# Patient Record
Sex: MALE | Race: WHITE | URBAN - METROPOLITAN AREA
[De-identification: names, ages, dates, MRNs, and addresses within clinical notes are randomized per-mention and may not be internally consistent; named-entity substitution may affect disease eponyms.]

---

## 2021-02-02 ENCOUNTER — OFFICE VISIT (OUTPATIENT)
Dept: PHYSICAL THERAPY | Facility: CLINIC | Age: 36
End: 2021-02-02
Payer: COMMERCIAL

## 2021-02-02 DIAGNOSIS — M54.50 ACUTE BILATERAL LOW BACK PAIN WITHOUT SCIATICA: Primary | ICD-10-CM

## 2021-02-02 PROCEDURE — 97110 THERAPEUTIC EXERCISES: CPT | Performed by: PHYSICAL THERAPIST

## 2021-02-02 PROCEDURE — 97161 PT EVAL LOW COMPLEX 20 MIN: CPT | Performed by: PHYSICAL THERAPIST

## 2021-02-02 PROCEDURE — 97140 MANUAL THERAPY 1/> REGIONS: CPT | Performed by: PHYSICAL THERAPIST

## 2021-02-02 NOTE — PROGRESS NOTES
PT Evaluation     Today's date: 2021  Patient name: Chikis Durán  : 1985  MRN: [id number]  Referring provider: No ref  provider found  Dx:   Encounter Diagnosis     ICD-10-CM    1  Acute bilateral low back pain without sciatica  M54 5                   Assessment  Assessment details: Patient presents with mild limitation in lumbar ROM with some left LB discomfort with movement  Reports going to PT in [place] and had no relief  Patient had rotation pubic symphysis and upslip of the right ASIS  Corrected with ME technique  Discussed HEP    Understanding of Dx/Px/POC: good   Prognosis: good    Goals  1 weeks  Decrease pain by 50%  Normal ROM  3 weeks  Independent with HEP  Normal alignment maintained    Plan  Planned therapy interventions: manual therapy, strengthening and stretching  Frequency: 1x week  Duration in weeks: 3        Subjective Evaluation    History of Present Illness  Mechanism of injury: Skiing - fell 2 weeks ago, new back pain  Pain  Current pain ratin  At worst pain ratin    Patient Goals  Patient goals for therapy: decreased pain and return to sport/leisure activities          Objective     Active Range of Motion     Lumbar   Flexion:  Restriction level: minimal  Extension:  Restriction level: moderate  Left rotation:  Restriction level: moderate  Right rotation:  Restriction level: minimal      Flowsheet Rows      Most Recent Value   PT/OT G-Codes   Current Score  94   Projected Score  100             Precautions: none      Manuals             30 30                                                   Neuro Re-Ed                                                                                                        Ther Ex                                                                                                                     Ther Activity                                       Gait Training                                       Modalities

## 2021-02-09 ENCOUNTER — APPOINTMENT (OUTPATIENT)
Dept: PHYSICAL THERAPY | Facility: CLINIC | Age: 36
End: 2021-02-09
Payer: COMMERCIAL